# Patient Record
Sex: FEMALE | Race: OTHER | URBAN - METROPOLITAN AREA
[De-identification: names, ages, dates, MRNs, and addresses within clinical notes are randomized per-mention and may not be internally consistent; named-entity substitution may affect disease eponyms.]

---

## 2023-09-08 ENCOUNTER — HOME HEALTH ADMISSION (OUTPATIENT)
Dept: HOME HEALTH SERVICES | Facility: HOME HEALTH | Age: 68
End: 2023-09-08
Payer: MEDICARE

## 2023-09-11 ENCOUNTER — HOME CARE VISIT (OUTPATIENT)
Dept: SCHEDULING | Facility: HOME HEALTH | Age: 68
End: 2023-09-11

## 2023-09-11 VITALS
HEART RATE: 78 BPM | DIASTOLIC BLOOD PRESSURE: 75 MMHG | RESPIRATION RATE: 15 BRPM | WEIGHT: 155 LBS | SYSTOLIC BLOOD PRESSURE: 124 MMHG | OXYGEN SATURATION: 98 % | BODY MASS INDEX: 22.19 KG/M2 | TEMPERATURE: 98.3 F | HEIGHT: 70 IN

## 2023-09-11 PROCEDURE — G0151 HHCP-SERV OF PT,EA 15 MIN: HCPCS

## 2023-09-11 PROCEDURE — 0221000100 HH NO PAY CLAIM PROCEDURE

## 2023-09-12 ENCOUNTER — HOME CARE VISIT (OUTPATIENT)
Dept: SCHEDULING | Facility: HOME HEALTH | Age: 68
End: 2023-09-12

## 2023-09-12 PROCEDURE — G0157 HHC PT ASSISTANT EA 15: HCPCS

## 2023-09-12 NOTE — HOME HEALTH
Pt reports no pain overnight since arriving home after surgery. Pt was instructed in use of strap, to stretch Gastroc, and to lift R LE. Pt demonstrated ability to transfer and amb w/o assistance. Pt was instructed in comfort positions, and elevation of LE to keep swelling to a minimum, and to limit pain. Pt will have wrapping removed tomorrow and will begin ROM and strengthening next visit.

## 2023-09-13 ENCOUNTER — HOME CARE VISIT (OUTPATIENT)
Dept: SCHEDULING | Facility: HOME HEALTH | Age: 68
End: 2023-09-13

## 2023-09-13 PROCEDURE — G0157 HHC PT ASSISTANT EA 15: HCPCS

## 2023-09-13 ASSESSMENT — ENCOUNTER SYMPTOMS
PAIN LOCATION - PAIN QUALITY: SORE
DYSPNEA ACTIVITY LEVEL: AFTER AMBULATING MORE THAN 20 FT

## 2023-09-13 NOTE — HOME HEALTH
Pt reports increased pain in R LE due to 'Pain Gel' wearing off and Bandage being too tight. . Pt unwrapped upper portion of Post Op wrapping to relieve pressure on thigh. Pts post op wrapping was removed and small amount of drainage noted at mid point of incision line. Steri Strips were placed over incision to help keep incision in tact. Pt instructed in and demonstrated positioning of R knee in Flexed and Extended positions, as well as, forced flexion w/use of strap. Pt demonstrated safe amb and transfers, and did not require A. Pt was re-instructed in comfort positions and use of ice to help reduce swelling. Pt will increase activity this weekend and will focus on ROM and Quad control.

## 2023-09-13 NOTE — HOME HEALTH
Patient resides in a single story home with no steps leading into the home. Pt is s/p R TKA performed by Dr. North Ansari. PMH: HTN, and anxiety. Patient is sitting on couch upon arrival of PT physical assessmetn. Vital signs stable. Skin warm, dry, and intact. Dressing intact. No s/s of infection or drainge noted. Instructed pt on use of ice therapy, ways to prevent constipation and s/s to report to the office. Patient reports generalized pain 8/10. Patient takes hydrocodone 5/325 mg. Patient reports relief from pain medications. Patient uses walker and requires minimal assistance x 1 for ambulation along with VC to increase heel stirke and hip extension GLENN to improve gait efficiency with greater glut activation. Patient teaching done on safety in the home, the importance of using assistive device for ambulation, and ways to prevent constipation. Patient educated on POC to work on R knee flexion, extension, and quad and hamstring and gastroc mobility exercises with importance to get back to maximal level of function and achieve 0 degrees of TKE at D/C.  Patient verbalized understanding of teaching

## 2023-09-18 ENCOUNTER — HOME CARE VISIT (OUTPATIENT)
Dept: SCHEDULING | Facility: HOME HEALTH | Age: 68
End: 2023-09-18

## 2023-09-18 PROCEDURE — G0157 HHC PT ASSISTANT EA 15: HCPCS

## 2023-09-19 VITALS
DIASTOLIC BLOOD PRESSURE: 84 MMHG | TEMPERATURE: 97.5 F | SYSTOLIC BLOOD PRESSURE: 150 MMHG | OXYGEN SATURATION: 97 % | HEART RATE: 93 BPM

## 2023-09-20 ENCOUNTER — HOME CARE VISIT (OUTPATIENT)
Dept: SCHEDULING | Facility: HOME HEALTH | Age: 68
End: 2023-09-20

## 2023-09-20 PROCEDURE — G0157 HHC PT ASSISTANT EA 15: HCPCS

## 2023-09-20 NOTE — HOME HEALTH
Pt reports minimal discomfort in R LE, but notes she has not been sleeping well for the past 3 nights. Pt denies pain causing her to wake up in early AM hours, but she has been unable to fall back asleep once she awakens. Pt demonstrated improved amb and ROM reached 75 degrees flexion w/o force. Pts R knee remains swollen and Echymosis has increased throughout R LE. Pt demonstrated all LE ex's for ROM and Strength, and was able to increase stride length during gt. Pt was instructed to walk longer distances to increase LE movement and to help sleep at night. Pt is progressing well.

## 2023-09-22 ENCOUNTER — HOME CARE VISIT (OUTPATIENT)
Dept: SCHEDULING | Facility: HOME HEALTH | Age: 68
End: 2023-09-22

## 2023-09-22 PROCEDURE — G0157 HHC PT ASSISTANT EA 15: HCPCS

## 2023-09-22 NOTE — HOME HEALTH
Pt reports she has been sleeping better but continues to be limited to 5 hours sleep max. Pt notes minimal pain in R knee but Swelling and stiffnes due to swelling remains. Pt instructed in various positions of stretch and was measured at 80 degrees flexion w/o force. Pt performed self stretching of R knee using Restorator, and was unable to make full revolutions. Pt was instructed tin use of kitchen counter for support to practice Single sided support in prep for [employer] usage. Pt performed well but remains too weak to use SPC at this point. Pt will be progressed as tolerated and is progressing well.

## 2023-09-24 VITALS
OXYGEN SATURATION: 97 % | DIASTOLIC BLOOD PRESSURE: 64 MMHG | HEART RATE: 105 BPM | SYSTOLIC BLOOD PRESSURE: 120 MMHG | TEMPERATURE: 97.5 F

## 2023-09-25 ENCOUNTER — HOME CARE VISIT (OUTPATIENT)
Dept: SCHEDULING | Facility: HOME HEALTH | Age: 68
End: 2023-09-25
Payer: MEDICARE

## 2023-09-25 PROCEDURE — G0157 HHC PT ASSISTANT EA 15: HCPCS

## 2023-09-26 NOTE — HOME HEALTH
Pt reports pain increase in sides of R knee following last visit and over the weekend. Pt notes she continued to struggle w/SLR but was able to perform SAQ and LAQ despite pain. Pt also noted that she walked more this weekend w/SPC and became \"very tired\" afterwards. Pt demonstrated good technique w/use of SPC for support, and increased pressure on it during gt. Pt received STM and Stretching to R knee into flexion and extension, but pain limited ROM allowed by pt. Pt notes overall improvement in function but feels 'Soreness in knee' is a set back.  Pt will be seen one more visit prior to MD visit and DC to Outpt PT.

## 2023-09-27 ENCOUNTER — HOME CARE VISIT (OUTPATIENT)
Dept: SCHEDULING | Facility: HOME HEALTH | Age: 68
End: 2023-09-27
Payer: MEDICARE

## 2023-09-27 PROCEDURE — G0157 HHC PT ASSISTANT EA 15: HCPCS

## 2023-09-29 ENCOUNTER — HOME CARE VISIT (OUTPATIENT)
Dept: SCHEDULING | Facility: HOME HEALTH | Age: 68
End: 2023-09-29
Payer: MEDICARE

## 2023-09-29 PROCEDURE — G0151 HHCP-SERV OF PT,EA 15 MIN: HCPCS

## 2023-09-29 NOTE — HOME HEALTH
Pt reports pain in knee from increased ambulation has decreased, but still remains in Medial and Lateral aspects of knee. Pt demonstrated 80 degrees flexion w/self force and c/o pain in Distal Quad w/increased pressure. Pt does not push through tension pain, but is attempting to stretch knee more. Pt demonstrated good stride and posture while using SPC around home, and notes she has limited distances since last weekend. Pt will be seeing MD on 9/28/23 and will be moving back to [place] next week, if able to fly. Pt to be DC'd from [facility] PT following MD visit. Pt will continue w/HEP and understands process for progression of ex's in case she does not start outpt PT for awhile.

## 2023-10-01 VITALS
OXYGEN SATURATION: 96 % | TEMPERATURE: 97.9 F | DIASTOLIC BLOOD PRESSURE: 65 MMHG | SYSTOLIC BLOOD PRESSURE: 123 MMHG | HEART RATE: 76 BPM | RESPIRATION RATE: 18 BRPM

## 2023-10-01 ASSESSMENT — ENCOUNTER SYMPTOMS: HEMOPTYSIS: 0

## 2023-10-02 NOTE — HOME HEALTH
Abelino Morgan had R TKA with associated edema, pain, weakness and limited knee flexion and extension along with RLE weakness that warrants skilled PT. Patient met all goals for discharge as she scored 28 on her Tinetti balance assessment and has exceeded all expectations with excellent therapy admission. Patient was educated on the importance of continuing her HEP as she has outpatient PT scheduled Monday along with following up with PCP if she has any need for home health in the future. Patient is independent with all transfers in and out of home and has achieved 0 degrees of R knee extension at discharge. Patient ambulated over 900 feet on uneven surface without any AAD with minor VC to increase hip extension and toe off for more efficient gait pattern.